# Patient Record
Sex: MALE | Race: WHITE | HISPANIC OR LATINO | Employment: FULL TIME | URBAN - METROPOLITAN AREA
[De-identification: names, ages, dates, MRNs, and addresses within clinical notes are randomized per-mention and may not be internally consistent; named-entity substitution may affect disease eponyms.]

---

## 2023-04-02 ENCOUNTER — ANESTHESIA (OUTPATIENT)
Dept: ANESTHESIOLOGY | Facility: HOSPITAL | Age: 58
End: 2023-04-02

## 2023-04-02 ENCOUNTER — ANESTHESIA EVENT (OUTPATIENT)
Dept: ANESTHESIOLOGY | Facility: HOSPITAL | Age: 58
End: 2023-04-02

## 2023-04-02 NOTE — ANESTHESIA PREPROCEDURE EVALUATION
Procedure:  PRE-OP ONLY    Relevant Problems   CARDIO   (+) Mixed hyperlipidemia        Physical Exam    Airway    Mallampati score: II  TM Distance: >3 FB  Neck ROM: full     Dental   No notable dental hx     Cardiovascular  Cardiovascular exam normal    Pulmonary  Pulmonary exam normal     Other Findings        Anesthesia Plan  ASA Score- 2     Anesthesia Type- IV sedation with anesthesia with ASA Monitors  Additional Monitors:   Airway Plan:           Plan Factors-Exercise tolerance (METS): >4 METS  Chart reviewed  EKG reviewed  Imaging results reviewed  Existing labs reviewed  Patient summary reviewed  Patient is not a current smoker  Patient not instructed to abstain from smoking on day of procedure  Patient did not smoke on day of surgery  Induction-     Postoperative Plan-     Informed Consent- Anesthetic plan and risks discussed with patient  I personally reviewed this patient with the CRNA  Discussed and agreed on the Anesthesia Plan with the CRNA  Mary Ellen Quarles

## 2023-04-03 ENCOUNTER — ANESTHESIA EVENT (OUTPATIENT)
Dept: GASTROENTEROLOGY | Facility: AMBULARY SURGERY CENTER | Age: 58
End: 2023-04-03

## 2023-04-03 ENCOUNTER — HOSPITAL ENCOUNTER (OUTPATIENT)
Dept: GASTROENTEROLOGY | Facility: AMBULARY SURGERY CENTER | Age: 58
Setting detail: OUTPATIENT SURGERY
Discharge: HOME | End: 2023-04-03
Attending: INTERNAL MEDICINE

## 2023-04-03 ENCOUNTER — ANESTHESIA (OUTPATIENT)
Dept: GASTROENTEROLOGY | Facility: AMBULARY SURGERY CENTER | Age: 58
End: 2023-04-03

## 2023-04-03 VITALS
BODY MASS INDEX: 29.25 KG/M2 | HEART RATE: 47 BPM | DIASTOLIC BLOOD PRESSURE: 71 MMHG | OXYGEN SATURATION: 98 % | TEMPERATURE: 97.6 F | HEIGHT: 66 IN | WEIGHT: 182 LBS | RESPIRATION RATE: 18 BRPM | SYSTOLIC BLOOD PRESSURE: 117 MMHG

## 2023-04-03 DIAGNOSIS — Z12.11 SCREENING FOR COLON CANCER: ICD-10-CM

## 2023-04-03 RX ORDER — PROPOFOL 10 MG/ML
INJECTION, EMULSION INTRAVENOUS AS NEEDED
Status: DISCONTINUED | OUTPATIENT
Start: 2023-04-03 | End: 2023-04-03

## 2023-04-03 RX ORDER — PROPOFOL 10 MG/ML
INJECTION, EMULSION INTRAVENOUS CONTINUOUS PRN
Status: DISCONTINUED | OUTPATIENT
Start: 2023-04-03 | End: 2023-04-03

## 2023-04-03 RX ORDER — LIDOCAINE HYDROCHLORIDE 10 MG/ML
INJECTION, SOLUTION EPIDURAL; INFILTRATION; INTRACAUDAL; PERINEURAL AS NEEDED
Status: DISCONTINUED | OUTPATIENT
Start: 2023-04-03 | End: 2023-04-03

## 2023-04-03 RX ORDER — SODIUM CHLORIDE, SODIUM LACTATE, POTASSIUM CHLORIDE, CALCIUM CHLORIDE 600; 310; 30; 20 MG/100ML; MG/100ML; MG/100ML; MG/100ML
INJECTION, SOLUTION INTRAVENOUS CONTINUOUS PRN
Status: DISCONTINUED | OUTPATIENT
Start: 2023-04-03 | End: 2023-04-03

## 2023-04-03 RX ADMIN — LIDOCAINE HYDROCHLORIDE 50 MG: 10 INJECTION, SOLUTION EPIDURAL; INFILTRATION; INTRACAUDAL; PERINEURAL at 09:45

## 2023-04-03 RX ADMIN — SODIUM CHLORIDE, SODIUM LACTATE, POTASSIUM CHLORIDE, AND CALCIUM CHLORIDE: .6; .31; .03; .02 INJECTION, SOLUTION INTRAVENOUS at 09:21

## 2023-04-03 RX ADMIN — PROPOFOL 160 MCG/KG/MIN: 10 INJECTION, EMULSION INTRAVENOUS at 09:45

## 2023-04-03 RX ADMIN — PROPOFOL 60 MG: 10 INJECTION, EMULSION INTRAVENOUS at 09:45

## 2023-04-03 NOTE — H&P
History and Physical - [facility]  Holly Cantor Jane 65 y o  male MRN: [id number]                  HPI: Hazel Enciso is a 65y o  year old male who presents for follow up of history of tubular adenoma removed in 2016  REVIEW OF SYSTEMS: Per the HPI, and otherwise unremarkable  Historical Information   History reviewed  No pertinent past medical history  Past Surgical History:   Procedure Laterality Date   • NO PAST SURGERIES     • [place] COLONOSCOPY FLX DX W/COLLJ SPEC WHEN PFRMD N/A 12/13/2016    Procedure: COLONOSCOPY;  Surgeon: Bella Prakash MD;  Location: [facility]; Service: Gastroenterology     Social History   Social History     Substance and Sexual Activity   Alcohol Use Yes   • Alcohol/week: 1 0 standard drink   • Types: 1 Cans of beer per week    Comment: Socially     Social History     Substance and Sexual Activity   Drug Use No     Social History     Tobacco Use   Smoking Status Former   • Types: Cigars   Smokeless Tobacco Never   Tobacco Comments    per [employer]     Family History   Problem Relation Age of Onset   • No Known Problems Mother    • No Known Problems Father    • No Known Problems Sister    • No Known Problems Brother    • No Known Problems Daughter    • No Known Problems Sister    • No Known Problems Sister    • No Known Problems Daughter    • No Known Problems Daughter        Meds/Allergies       Current Outpatient Medications:   •  atorvastatin (LIPITOR) 20 mg tablet  •  famotidine (PEPCID) 20 mg tablet  •  meclizine (ANTIVERT) 25 mg tablet  •  al mag oxide-diphenhydramine-lidocaine viscous (MAGIC MOUTHWASH) 1:1:1 suspension  •  omega-3-acid ethyl esters (LOVAZA) 1 g capsule  •  Promethazine-DM (PHENERGAN-DM) 6 25-15 mg/5 mL oral syrup  •  simethicone (MYLICON,GAS-X) 790 MG capsule  No current facility-administered medications for this encounter      Facility-Administered Medications Ordered in Other Encounters:   •  lactated ringers infusion, , "Intravenous, Continuous PRN, New Bag at 04/03/23 0276    No Known Allergies    Objective     /60   Pulse (!) 50   Temp (!) 96 9 °F (36 1 °C) (Temporal)   Resp 18   Ht 5' 6\" (1 676 m)   Wt 82 6 kg (182 lb)   SpO2 98%   BMI 29 38 kg/m²       PHYSICAL EXAM    Gen: NAD  Head: NCAT  CV: RRR  CHEST: Clear  ABD: soft, NT/ND  EXT: no edema      ASSESSMENT/PLAN:  This is a 65y o  year old male here for colonoscopy, and he is stable and optimized for his procedure        "

## 2023-04-03 NOTE — ANESTHESIA PREPROCEDURE EVALUATION
Procedure:  COLONOSCOPY    Relevant Problems   CARDIO   (+) Mixed hyperlipidemia        Physical Exam    Airway    Mallampati score: II  TM Distance: >3 FB  Neck ROM: full     Dental   No notable dental hx     Cardiovascular  Cardiovascular exam normal    Pulmonary  Pulmonary exam normal     Other Findings        Anesthesia Plan  ASA Score- 2     Anesthesia Type- IV sedation with anesthesia with ASA Monitors  Additional Monitors:   Airway Plan:           Plan Factors-Exercise tolerance (METS): >4 METS  Chart reviewed  EKG reviewed  Imaging results reviewed  Existing labs reviewed  Patient summary reviewed  Patient is not a current smoker  Patient not instructed to abstain from smoking on day of procedure  Patient did not smoke on day of surgery  Induction-     Postoperative Plan-     Informed Consent- Anesthetic plan and risks discussed with patient  I personally reviewed this patient with the CRNA  Discussed and agreed on the Anesthesia Plan with the CRNA  Loraine Marlow

## 2023-04-03 NOTE — ANESTHESIA POSTPROCEDURE EVALUATION
Post-Op Assessment Note    CV Status:  Stable  Pain Score: 0    Pain management: adequate     Mental Status:  Alert and awake   Hydration Status:  Euvolemic   PONV Controlled:  Controlled   Airway Patency:  Patent      Post Op Vitals Reviewed: Yes      Staff: CRNA         There were no known notable events for this encounter      BP 98/54 (04/03/23 1004)    Temp 97 6 °F (36 4 °C) (04/03/23 1004)    Pulse (!) 48 (04/03/23 1004)   Resp 18 (04/03/23 1004)    SpO2 96 % (04/03/23 1004)

## 2023-05-24 ENCOUNTER — OFFICE VISIT (OUTPATIENT)
Dept: FAMILY MEDICINE CLINIC | Facility: CLINIC | Age: 58
End: 2023-05-24

## 2023-05-24 VITALS
RESPIRATION RATE: 16 BRPM | DIASTOLIC BLOOD PRESSURE: 70 MMHG | SYSTOLIC BLOOD PRESSURE: 120 MMHG | HEART RATE: 61 BPM | OXYGEN SATURATION: 98 % | TEMPERATURE: 99.7 F | HEIGHT: 66 IN | BODY MASS INDEX: 29.77 KG/M2 | WEIGHT: 185.2 LBS

## 2023-05-24 DIAGNOSIS — Z11.4 SCREENING FOR HIV (HUMAN IMMUNODEFICIENCY VIRUS): ICD-10-CM

## 2023-05-24 DIAGNOSIS — Z00.00 ANNUAL PHYSICAL EXAM: Primary | ICD-10-CM

## 2023-05-24 DIAGNOSIS — Z12.5 SCREENING FOR PROSTATE CANCER: ICD-10-CM

## 2023-05-24 DIAGNOSIS — R14.0 ABDOMINAL BLOATING: ICD-10-CM

## 2023-05-24 DIAGNOSIS — Z11.59 ENCOUNTER FOR HEPATITIS C SCREENING TEST FOR LOW RISK PATIENT: ICD-10-CM

## 2023-05-24 DIAGNOSIS — E78.2 MIXED HYPERLIPIDEMIA: ICD-10-CM

## 2023-05-24 RX ORDER — OMEPRAZOLE 40 MG/1
40 CAPSULE, DELAYED RELEASE ORAL DAILY
Qty: 30 CAPSULE | Refills: 1 | Status: SHIPPED | OUTPATIENT
Start: 2023-05-24

## 2023-05-24 NOTE — PROGRESS NOTES
ADULT ANNUAL [facility]    NAME: Aletha Oro  AGE: 63 y o  SEX: male  : 1965     DATE: 2023     Assessment and Plan:     Problem List Items Addressed This Visit        Other    Mixed hyperlipidemia     Updated lab work ordered  No current statin therapy (previoulsy on but stopped on his own)         Relevant Orders    Lipid panel    TSH, 3rd generation with Free T4 reflex    Abdominal bloating     Colonoscopy unrevealing  Stop Pepcid and start Omeprazole   May continue simethicone prn   Referred back to GI   Keep a symptom and food diary          Relevant Medications    omeprazole (PriLOSEC) 40 MG capsule    Other Relevant Orders    Ambulatory Referral to Gastroenterology   Other Visit Diagnoses     Annual physical exam    -  Primary    Relevant Orders    CBC and differential    Comprehensive metabolic panel    Screening for prostate cancer        Relevant Orders    PSA, Total Screen    Encounter for hepatitis C screening test for low risk patient        Relevant Orders    Hepatitis C antibody    Screening for HIV (human immunodeficiency virus)        Relevant Orders    : HIV 1/2 AB/AG w Reflex [facility] for 2 yr old and above          Immunizations and preventive care screenings were discussed with patient today  Appropriate education was printed on patient's after visit summary  Discussed risks and benefits of prostate cancer screening  We discussed the controversial history of PSA screening for prostate cancer in the [place] as well as the risk of over detection and over treatment of prostate cancer by way of PSA screening  The patient understands that PSA blood testing is an imperfect way to screen for prostate cancer and that elevated PSA levels in the blood may also be caused by infection, inflammation, prostatic trauma or manipulation, urological procedures, or by benign prostatic enlargement      The role of the digital rectal examination in prostate cancer screening was also discussed and I discussed with him that there is large interobserver variability in the findings of digital rectal examination  Counseling:  Alcohol/drug use: discussed moderation in alcohol intake, the recommendations for healthy alcohol use, and avoidance of illicit drug use  Dental Health: discussed importance of regular tooth brushing, flossing, and dental visits  Injury prevention: discussed safety/seat belts, safety helmets, smoke detectors, carbon dioxide detectors, and smoking near bedding or upholstery  Sexual health: discussed sexually transmitted diseases, partner selection, use of condoms, avoidance of unintended pregnancy, and contraceptive alternatives  · Exercise: the importance of regular exercise/physical activity was discussed  Recommend exercise 3-5 times per week for at least 30 minutes  BMI Counseling: Body mass index is 29 89 kg/m²  The BMI is above normal  Nutrition recommendations include encouraging healthy choices of fruits and vegetables, moderation in carbohydrate intake and increasing intake of lean protein  Rationale for BMI follow-up plan is due to patient being overweight or obese  Depression Screening and Follow-up Plan: Patient was screened for depression during today's encounter  They screened negative with a PHQ-2 score of 0  No follow-ups on file  Chief Complaint:     No chief complaint on file  History of Present Illness:     Adult Annual Physical   Patient here for a comprehensive physical exam  Patient is Swiss speaking and the  line was used for today's visit  He saw Dr Malathi Coughlin in our office back in January 2023 with abdominal bloating and was started on Pepcid and Gas-X  Pt was also referred to GI for a colonoscopy  Colonoscopy was completed 4/3/23 with Dr Heaven William which showed sigmoid diverticulosis but otherwise normal, repeat due in 5 years    Pt tells me that the Pepcid and Gas-X did not provide any relief and he still has some abdominal bloating with increased belching  Not always related to food/eating  Occurs first thing in the morning some days  He did decrease spicy foods with no improvement  No bowel habit changes, no N/V    Diet and Physical Activity  · Diet/Nutrition: poor diet  · Exercise: no formal exercise  Depression Screening  PHQ-2/9 Depression Screening    Little interest or pleasure in doing things: 0 - not at all  Feeling down, depressed, or hopeless: 0 - not at all  PHQ-2 Score: 0  PHQ-2 Interpretation: Negative depression screen       General Health  · Sleep: sleeps well  · Hearing: normal - bilateral   · Vision: no vision problems  · Dental: regular dental visits   Health  · Symptoms include: none     Review of Systems:     Review of Systems   Constitutional: Negative for chills and fever  HENT: Negative for ear pain and sore throat  Eyes: Negative for pain and visual disturbance  Respiratory: Negative for cough and shortness of breath  Cardiovascular: Negative for chest pain and palpitations  Gastrointestinal: Negative for abdominal pain, blood in stool, constipation, diarrhea, nausea and vomiting  As noted in HPI   Genitourinary: Negative for dysuria and hematuria  Musculoskeletal: Negative for arthralgias and back pain  Skin: Negative for color change and rash  Neurological: Negative for seizures and syncope  Hematological: Negative for adenopathy  Does not bruise/bleed easily  All other systems reviewed and are negative  Past Medical History:     No past medical history on file  Past Surgical History:     Past Surgical History:   Procedure Laterality Date   • NO PAST SURGERIES     • [place] COLONOSCOPY FLX DX W/COLLJ SPEC WHEN PFRMD N/A 12/13/2016    Procedure: COLONOSCOPY;  Surgeon: Angeline Palm MD;  Location: [facility];   Service: Gastroenterology      Family History:     Family History Problem Relation Age of Onset   • No Known Problems Mother    • No Known Problems Father    • No Known Problems Sister    • No Known Problems Brother    • No Known Problems Daughter    • No Known Problems Sister    • No Known Problems Sister    • No Known Problems Daughter    • No Known Problems Daughter       Social History:     Social History     Socioeconomic History   • Marital status: Single     Spouse name: Not on file   • Number of children: 3   • Years of education: Not on file   • Highest education level: Not on file   Occupational History   • Not on file   Tobacco Use   • Smoking status: Former     Types: Cigars   • Smokeless tobacco: Never   • Tobacco comments:     per [employer]   Vaping Use   • Vaping Use: Never used   Substance and Sexual Activity   • Alcohol use:  Yes     Alcohol/week: 1 0 standard drink of alcohol     Types: 1 Cans of beer per week     Comment: Socially   • Drug use: No   • Sexual activity: Not on file   Other Topics Concern   • Not on file   Social History Narrative    Lives with adult children    Dental care     Social Determinants of Health     Financial Resource Strain: Not on file   Food Insecurity: Not on file   Transportation Needs: Not on file   Physical Activity: Not on file   Stress: Not on file   Social Connections: Not on file   Intimate Partner Violence: Not on file   Housing Stability: Not on file      Current Medications:     Current Outpatient Medications   Medication Sig Dispense Refill   • atorvastatin (LIPITOR) 20 mg tablet Take 1 tablet (20 mg total) by mouth daily 90 tablet 1   • famotidine (PEPCID) 20 mg tablet Take 1 tablet (20 mg total) by mouth 2 (two) times a day 60 tablet 2   • omeprazole (PriLOSEC) 40 MG capsule Take 1 capsule (40 mg total) by mouth daily 30 capsule 1   • simethicone (MYLICON,GAS-X) 373 MG capsule Take 1 capsule (180 mg total) by mouth every 6 (six) hours as needed for flatulence 30 capsule 2   • al mag oxide-diphenhydramine-lidocaine "viscous (MAGIC MOUTHWASH) 1:1:1 suspension Swish and swallow 10 mL every 4 (four) hours as needed for mouth pain or discomfort (Patient not taking: Reported on 1/26/2023) 90 mL 0   • meclizine (ANTIVERT) 25 mg tablet Take 1 tablet (25 mg total) by mouth 3 (three) times a day as needed for dizziness 30 tablet 0   • omega-3-acid ethyl esters (LOVAZA) 1 g capsule Take 2 capsules (2 g total) by mouth daily (Patient not taking: Reported on 1/26/2023) 180 capsule 1   • Promethazine-DM (PHENERGAN-DM) 6 25-15 mg/5 mL oral syrup Take 5 mL by mouth 4 (four) times a day as needed for cough (Patient not taking: Reported on 1/26/2023) 118 mL 0     No current facility-administered medications for this visit  Allergies:     No Known Allergies   Physical Exam:     /70 (BP Location: Left arm, Patient Position: Sitting, Cuff Size: Adult)   Pulse 61   Temp 99 7 °F (37 6 °C) (Tympanic)   Resp 16   Ht 5' 6\" (1 676 m)   Wt 84 kg (185 lb 3 2 oz)   SpO2 98%   BMI 29 89 kg/m²     Physical Exam  Vitals and nursing note reviewed  Constitutional:       General: He is not in acute distress  Appearance: He is well-developed  He is obese  He is not ill-appearing, toxic-appearing or diaphoretic  HENT:      Head: Normocephalic and atraumatic  Eyes:      Extraocular Movements: Extraocular movements intact  Conjunctiva/sclera: Conjunctivae normal       Pupils: Pupils are equal, round, and reactive to light  Neck:      Vascular: No carotid bruit  Cardiovascular:      Rate and Rhythm: Normal rate and regular rhythm  Heart sounds: Normal heart sounds  No murmur heard  Pulmonary:      Effort: Pulmonary effort is normal  No respiratory distress  Breath sounds: Normal breath sounds  Abdominal:      General: Bowel sounds are normal  There is no distension  Palpations: Abdomen is soft  There is no mass  Tenderness: There is no abdominal tenderness   There is no right CVA tenderness, left CVA " tenderness, guarding or rebound  Hernia: No hernia is present  Musculoskeletal:         General: No swelling  Normal range of motion  Cervical back: Normal range of motion and neck supple  No rigidity or tenderness  Lymphadenopathy:      Cervical: No cervical adenopathy  Skin:     General: Skin is warm and dry  Capillary Refill: Capillary refill takes less than 2 seconds  Neurological:      General: No focal deficit present  Mental Status: He is alert and oriented to person, place, and time  Psychiatric:         Mood and Affect: Mood normal          Behavior: Behavior normal          Thought Content:  Thought content normal          Judgment: Judgment normal           Linda Nelson, [address]

## 2023-05-24 NOTE — ASSESSMENT & PLAN NOTE
Colonoscopy unrevealing  Stop Pepcid and start Omeprazole   May continue simethicone prn   Referred back to GI   Keep a symptom and food diary

## 2023-05-27 ENCOUNTER — APPOINTMENT (OUTPATIENT)
Dept: LAB | Facility: CLINIC | Age: 58
End: 2023-05-27

## 2023-05-27 DIAGNOSIS — Z11.59 ENCOUNTER FOR HEPATITIS C SCREENING TEST FOR LOW RISK PATIENT: ICD-10-CM

## 2023-05-27 DIAGNOSIS — Z11.4 SCREENING FOR HIV (HUMAN IMMUNODEFICIENCY VIRUS): ICD-10-CM

## 2023-05-27 DIAGNOSIS — Z00.00 ANNUAL PHYSICAL EXAM: ICD-10-CM

## 2023-05-27 DIAGNOSIS — E78.2 MIXED HYPERLIPIDEMIA: ICD-10-CM

## 2023-05-27 DIAGNOSIS — Z12.5 SCREENING FOR PROSTATE CANCER: ICD-10-CM

## 2023-05-27 LAB
ALBUMIN SERPL BCP-MCNC: 3.8 G/DL (ref 3.5–5)
ALP SERPL-CCNC: 80 U/L (ref 46–116)
ALT SERPL W P-5'-P-CCNC: 71 U/L (ref 12–78)
ANION GAP SERPL CALCULATED.3IONS-SCNC: 0 MMOL/L (ref 4–13)
AST SERPL W P-5'-P-CCNC: 34 U/L (ref 5–45)
BASOPHILS # BLD AUTO: 0.03 THOUSANDS/ÂΜL (ref 0–0.1)
BASOPHILS NFR BLD AUTO: 1 % (ref 0–1)
BILIRUB SERPL-MCNC: 0.42 MG/DL (ref 0.2–1)
BUN SERPL-MCNC: 18 MG/DL (ref 5–25)
CALCIUM SERPL-MCNC: 8.6 MG/DL (ref 8.3–10.1)
CHLORIDE SERPL-SCNC: 109 MMOL/L (ref 96–108)
CHOLEST SERPL-MCNC: 210 MG/DL
CO2 SERPL-SCNC: 28 MMOL/L (ref 21–32)
CREAT SERPL-MCNC: 0.83 MG/DL (ref 0.6–1.3)
EOSINOPHIL # BLD AUTO: 0.08 THOUSAND/ÂΜL (ref 0–0.61)
EOSINOPHIL NFR BLD AUTO: 2 % (ref 0–6)
ERYTHROCYTE [DISTWIDTH] IN BLOOD BY AUTOMATED COUNT: 12.7 % (ref 11.6–15.1)
GFR SERPL CREATININE-BSD FRML MDRD: 97 ML/MIN/1.73SQ M
GLUCOSE P FAST SERPL-MCNC: 110 MG/DL (ref 65–99)
HCT VFR BLD AUTO: 46.5 % (ref 36.5–49.3)
HDLC SERPL-MCNC: 30 MG/DL
HGB BLD-MCNC: 15.3 G/DL (ref 12–17)
HIV 1+2 AB+HIV1 P24 AG SERPL QL IA: NORMAL
HIV 2 AB SERPL QL IA: NORMAL
HIV1 AB SERPL QL IA: NORMAL
HIV1 P24 AG SERPL QL IA: NORMAL
IMM GRANULOCYTES # BLD AUTO: 0.01 THOUSAND/UL (ref 0–0.2)
IMM GRANULOCYTES NFR BLD AUTO: 0 % (ref 0–2)
LDLC SERPL CALC-MCNC: 134 MG/DL (ref 0–100)
LYMPHOCYTES # BLD AUTO: 1.9 THOUSANDS/ÂΜL (ref 0.6–4.47)
LYMPHOCYTES NFR BLD AUTO: 35 % (ref 14–44)
MCH RBC QN AUTO: 31.3 PG (ref 26.8–34.3)
MCHC RBC AUTO-ENTMCNC: 32.9 G/DL (ref 31.4–37.4)
MCV RBC AUTO: 95 FL (ref 82–98)
MONOCYTES # BLD AUTO: 0.32 THOUSAND/ÂΜL (ref 0.17–1.22)
MONOCYTES NFR BLD AUTO: 6 % (ref 4–12)
NEUTROPHILS # BLD AUTO: 3.17 THOUSANDS/ÂΜL (ref 1.85–7.62)
NEUTS SEG NFR BLD AUTO: 56 % (ref 43–75)
NONHDLC SERPL-MCNC: 180 MG/DL
NRBC BLD AUTO-RTO: 0 /100 WBCS
PLATELET # BLD AUTO: 198 THOUSANDS/UL (ref 149–390)
PMV BLD AUTO: 10.4 FL (ref 8.9–12.7)
POTASSIUM SERPL-SCNC: 4.1 MMOL/L (ref 3.5–5.3)
PROT SERPL-MCNC: 7.6 G/DL (ref 6.4–8.4)
PSA SERPL-MCNC: 0.34 NG/ML (ref 0–4)
RBC # BLD AUTO: 4.89 MILLION/UL (ref 3.88–5.62)
SODIUM SERPL-SCNC: 137 MMOL/L (ref 135–147)
TRIGL SERPL-MCNC: 230 MG/DL
TSH SERPL DL<=0.05 MIU/L-ACNC: 2.81 UIU/ML (ref 0.45–4.5)
WBC # BLD AUTO: 5.51 THOUSAND/UL (ref 4.31–10.16)

## 2023-05-28 LAB — HCV AB SER QL: NORMAL

## 2023-05-30 DIAGNOSIS — R73.01 IFG (IMPAIRED FASTING GLUCOSE): Primary | ICD-10-CM

## 2023-05-30 DIAGNOSIS — E78.2 MIXED HYPERLIPIDEMIA: ICD-10-CM

## 2023-05-30 RX ORDER — ATORVASTATIN CALCIUM 20 MG/1
20 TABLET, FILM COATED ORAL DAILY
Qty: 90 TABLET | Refills: 1 | Status: SHIPPED | OUTPATIENT
Start: 2023-05-30

## 2023-08-15 ENCOUNTER — OFFICE VISIT (OUTPATIENT)
Dept: GASTROENTEROLOGY | Facility: CLINIC | Age: 58
End: 2023-08-15
Payer: COMMERCIAL

## 2023-08-15 VITALS
DIASTOLIC BLOOD PRESSURE: 62 MMHG | WEIGHT: 182 LBS | HEIGHT: 66 IN | BODY MASS INDEX: 29.25 KG/M2 | SYSTOLIC BLOOD PRESSURE: 120 MMHG | TEMPERATURE: 97.6 F

## 2023-08-15 DIAGNOSIS — R14.0 ABDOMINAL BLOATING: Primary | ICD-10-CM

## 2023-08-15 PROCEDURE — 99214 OFFICE O/P EST MOD 30 MIN: CPT | Performed by: PHYSICIAN ASSISTANT

## 2023-08-15 NOTE — PROGRESS NOTES
[facility] - Outpatient Follow-up Note  [facility] 63 y.o. male MRN: [id number]  Encounter: [id number]          ASSESSMENT AND PLAN:      Shama Mascorro is a 64 y/o Cameroonian-speaking male with with HLD who presents for follow-up. 1. Abdominal bloating  Pt presents because he wanted to discuss his abdominal bloating as he notes he gets this a few times/week; he says he does take GAS-X but is unsure if this is working. He denies pain, heartburn or changes in bowel habits. He says the episodes are very brief, but he was unsure if this is "normal" or not. -explained to pt that this could certainly be due to what he is eating or how much he is evacuating his bowels so he can start a journal to try to tease this out since it is only occurring a few times/week  -start OTC BEANO PRN  ______________________________________________________________________    SUBJECTIVE:  Shama Mascorro is a 64 y/o Cameroonian-speaking male with with HLD who presents for follow-up. Pt says he is here to discuss if this bloating is "normal." he says since he was young, he would wake up with bloating some mornings but they only lasted for a couple of minutes and he is relieved. He says this occurs a few times/week and is not severe. Pt denies abdominal pain, n/v, heartburn, constipation or straining, diarrhea, NSAID use, bloody or black BMs. REVIEW OF SYSTEMS IS OTHERWISE NEGATIVE. Historical Information   History reviewed. No pertinent past medical history. Past Surgical History:   Procedure Laterality Date   • COLONOSCOPY     • NO PAST SURGERIES     • [place] COLONOSCOPY FLX DX W/COLLJ SPEC WHEN PFRMD N/A 12/13/2016    Procedure: COLONOSCOPY;  Surgeon: Yenny Chin MD;  Location: [facility];   Service: Gastroenterology     Social History   Social History     Substance and Sexual Activity   Alcohol Use Yes   • Alcohol/week: 1.0 standard drink of alcohol   • Types: 1 Cans of beer per week    Comment: Socially     Social History     Substance and Sexual Activity   Drug Use No     Social History     Tobacco Use   Smoking Status Former   • Types: Cigars   Smokeless Tobacco Never   Tobacco Comments    per [employer]scripts     Family History   Problem Relation Age of Onset   • No Known Problems Mother    • No Known Problems Father    • No Known Problems Sister    • No Known Problems Brother    • No Known Problems Daughter    • No Known Problems Sister    • No Known Problems Sister    • No Known Problems Daughter    • No Known Problems Daughter        Meds/Allergies       Current Outpatient Medications:   •  al mag oxide-diphenhydramine-lidocaine viscous (MAGIC MOUTHWASH) 1:1:1 suspension  •  atorvastatin (LIPITOR) 20 mg tablet  •  famotidine (PEPCID) 20 mg tablet  •  meclizine (ANTIVERT) 25 mg tablet  •  omega-3-acid ethyl esters (LOVAZA) 1 g capsule  •  omeprazole (PriLOSEC) 40 MG capsule  •  Promethazine-DM (PHENERGAN-DM) 6.25-15 mg/5 mL oral syrup  •  simethicone (MYLICON,GAS-X) 570 MG capsule    No Known Allergies        Objective     Blood pressure 120/62, temperature 97.6 °F (36.4 °C), temperature source Tympanic, height 5' 6" (1.676 m), weight 82.6 kg (182 lb). Body mass index is 29.38 kg/m². PHYSICAL EXAM:      General Appearance:   Alert, cooperative, no distress   HEENT:   Normocephalic, atraumatic, anicteric.     Neck:  Supple, symmetrical, trachea midline   Lungs:   Clear to auscultation bilaterally; no rales, rhonchi or wheezing; respirations unlabored    Heart[de-identified]   Regular rate and rhythm; no murmur, rub, or gallop. Abdomen:   Soft, non-tender, non-distended; normal bowel sounds; no masses, no organomegaly    Genitalia:   Deferred    Rectal:   Deferred    Extremities:  No cyanosis, clubbing or edema    Pulses:  2+ and symmetric    Skin:  No jaundice, rashes, or lesions    Lymph nodes:  No palpable cervical lymphadenopathy        Lab Results:   No visits with results within 1 Day(s) from this visit.    Latest known visit with results is:   Appointment on 05/27/2023   Component Date Value   • WBC 05/27/2023 5.51    • RBC 05/27/2023 4.89    • Hemoglobin 05/27/2023 15.3    • Hematocrit 05/27/2023 46.5    • MCV 05/27/2023 95    • MCH 05/27/2023 31.3    • MCHC 05/27/2023 32.9    • RDW 05/27/2023 12.7    • MPV 05/27/2023 10.4    • Platelets 00/70/2420 198    • nRBC 05/27/2023 0    • Neutrophils Relative 05/27/2023 56    • Immat GRANS % 05/27/2023 0    • Lymphocytes Relative 05/27/2023 35    • Monocytes Relative 05/27/2023 6    • Eosinophils Relative 05/27/2023 2    • Basophils Relative 05/27/2023 1    • Neutrophils Absolute 05/27/2023 3.17    • Immature Grans Absolute 05/27/2023 0.01    • Lymphocytes Absolute 05/27/2023 1.90    • Monocytes Absolute 05/27/2023 0.32    • Eosinophils Absolute 05/27/2023 0.08    • Basophils Absolute 05/27/2023 0.03    • Sodium 05/27/2023 137    • Potassium 05/27/2023 4.1    • Chloride 05/27/2023 109 (H)    • CO2 05/27/2023 28    • ANION GAP 05/27/2023 0 (L)    • BUN 05/27/2023 18    • Creatinine 05/27/2023 0.83    • Glucose, Fasting 05/27/2023 110 (H)    • Calcium 05/27/2023 8.6    • AST 05/27/2023 34    • ALT 05/27/2023 71    • Alkaline Phosphatase 05/27/2023 80    • Total Protein 05/27/2023 7.6    • Albumin 05/27/2023 3.8    • Total Bilirubin 05/27/2023 0.42    • eGFR 05/27/2023 97    • Cholesterol 05/27/2023 210 (H)    • Triglycerides 05/27/2023 230 (H)    • HDL, Direct 05/27/2023 30 (L)    • LDL Calculated 05/27/2023 134 (H)    • Non-HDL-Chol (CHOL-HDL) 05/27/2023 180    • TSH 3RD GENERATON 05/27/2023 2.808    • PSA 05/27/2023 0.34    • Hepatitis C Ab 05/27/2023 Non-reactive    • HIV-1 p24 Antigen 05/27/2023 Non-Reactive    • HIV-1 Antibody 05/27/2023 Non-Reactive    • HIV-2 Antibody 05/27/2023 Non-Reactive    • HIV Ag-Ab 5th Gen 05/27/2023 Non-Reactive          Radiology Results:   No results found.

## 2023-08-15 NOTE — PATIENT INSTRUCTIONS
Start using over-the-counter BEANO as needed for bloating     Diverticulosis   LO QUE NECESITA SABER:   La diverticulosis es mendez condición que ocasiona que se formen pequeñas bolsas llamadas divertícula en el intestino. Estas bolsas dificultan que las evacuaciones intestinales pasen a través del [facility]. INSTRUCCIONES SOBRE EL IVORY HOSPITALARIA:   Regrese a la galen de emergencias si:  Siente dolor prasanna en el lado polo de la parte inferior del abdomen. Ivelisse deposiciones son de color martínez oscuro o brillante. Llame a lord médico si:  Usted tiene fiebre y escalofríos. Usted se siente mareado o aturdido. Usted tiene náuseas o está vomitando. Usted nota un cambio en ivelisse evacuaciones intestinales. Usted tiene preguntas o inquietudes acerca de lord condición o cuidado. Medicamentos:  Los medicamentos puede administrarse para facilitar la evacuación intestinal. Puede que también necesite medicamentos para tratar síntomas tales Ledell Ghotra y dolor. Presho ivelisse medicamentos elisa se le haya indicado. Consulte con lord médico si usted meli que lord medicamento no le está ayudando o si presenta efectos secundarios. Infórmele al médico si usted es alérgico a algún medicamento. Mantenga mendez lista actualizada de los [place], las vitaminas y los productos herbales que almita. Incluya los siguientes datos de los medicamentos: cantidad, frecuencia y motivo de administración. Traiga con usted la lista o los envases de las píldoras a ivelisse citas de seguimiento. Lleve la lista de los medicamentos con usted en dillon de mendez emergencia. Cuidados personales: El objetivo del tratamiento es controlar los síntomas que tiene y evitar otros problemas elisa la diverticulitis. La diverticulitis es la inflamación o infección de los divertículos. Lord médico podría recomendarle cualquiera de los siguientes:  Consuma mendez variedad de alimentos ricos en [place].  Los alimentos altos en [employer] ayudan a regular los movimientos intestinales. Los alimentos con alto contenido de fibra [employer] frijoles cocidos, las frutas, verduras y algunos cereales. La mayoría de los adultos necesitan de 25 a 35 gramos de fibra por día. Lord médico le puede recomendar que [place]. Pregunte a lord médico cuánta fibra debería consumir. Aumente la fibra lentamente. Usted podría presentar malestar o inflamación estomacal y gases si añade fibra a lord dieta demasiado rápido. Usted podría necesitar constance un suplemento de fibra si no está recibiendo suficiente de los alimentos. [place] líquidos elisa se le haya indicado. Es posible que usted necesite beber de 2 a 3 litros (8 a 12 vasos) de líquido cada día. Pregunte a lord médico sobre la cantidad de líquido que necesita constance todos los nelson y cuáles le recomienda. Aplique calor sobre el abdomen de 20 a 30 minutos cada 2 horas por los [employer] indiquen. El calor ayuda a disminuir el dolor y los espasmos musculares. Ayuda a prevenir la diverticulitis u otros síntomas: Lo siguiente puede ayudar a disminuir el riesgo de diverticulitis o síntomas, elisa hemorragias. Hable con lord proveedor acerca de estos u otras cosas que puede hacer para prevenir los problemas que pueden ocurrir con diverticulosis. Realice actividad física con regularidad. Pregunte a lord médico acerca del mejor plan de ejercicio para usted. El ejercicio puede ayudarlo a tener evacuaciones intestinales regulares. Francis 30 minutos de ejercicio la mayoría de los días de [place]. Mantenga un peso saludable. Pregúntele a lord médico cuál es el peso ideal para usted. Pídale que lo ayude a crear un plan para bajar de peso si tiene sobrepeso. No fume. La nicotina y otros químicos en los cigarrillos incrementan el riesgo de diverticulitis. Pida información a lord médico si usted actualmente fuma y necesita ayuda para dejar de fumar. Los cigarrillos electrónicos o el tabaco sin humo igualmente contienen nicotina.  Consulte con lord médico antes de [employer]. Pregunte a lord médico si es seguro constance AINES. AINES puede aumentar lord riesgo de diverticulitis. Acuda a la consulta de control con lord médico según las indicaciones: Anote jun preguntas para que se acuerde de hacerlas barney jun visitas. © Copyright [facility] 2022 Information is for End User's use only and may not be sold, redistributed or otherwise used for commercial purposes. Esta información es sólo para uso en educación. Lord intención no es darle un consejo médico sobre enfermedades o tratamientos. Colsulte con lord [place] Sorrel farmacéutico antes de seguir cualquier régimen médico para saber si es seguro y efectivo para usted. Diverticulosis   WHAT YOU NEED TO KNOW:   Diverticulosis is a condition that causes small pockets called diverticula to form in your intestine. These pockets make it difficult for bowel movements to pass through your digestive system. DISCHARGE INSTRUCTIONS:   Return to the emergency department if:   You have severe pain on the left side of your lower abdomen. Your bowel movements are bright or dark red. Call your doctor if:   You have a fever and chills. You feel dizzy or lightheaded. You have nausea, or you are vomiting. You have a change in your bowel movements. You have questions or concerns about your condition or care. Medicines:   Medicines  to soften your bowel movements may be given. You may also need medicines to treat symptoms such as bloating and pain. Take your medicine as directed. Contact your healthcare provider if you think your medicine is not helping or if you have side effects. Tell your provider if you are allergic to any medicine. Keep a list of the medicines, vitamins, and herbs you take. Include the amounts, and when and why you take them. Bring the list or the pill bottles to follow-up visits. Carry your medicine list with you in case of an emergency. Self-care:   The goal of treatment is to manage any symptoms you have and prevent other problems such as diverticulitis. Diverticulitis is swelling or infection of the diverticula. Your healthcare provider may recommend any of the following:  Eat a variety of high-fiber foods. High-fiber foods help you have regular bowel movements. High-fiber foods include cooked beans, fruits, vegetables, and some cereals. Most adults need 25 to 35 grams of fiber each day. Your healthcare provider may recommend that you have more. Ask your healthcare provider how much fiber you need. Increase fiber slowly. You may have abdominal discomfort, bloating, and gas if you add fiber to your diet too quickly. You may need to take a fiber supplement if you are not getting enough fiber from food. Drink liquids as directed. You may need to drink 2 to 3 liters (8 to 12 cups) of liquids every day. Ask your healthcare provider how much liquid to drink each day and which liquids are best for you. Apply heat  on your abdomen for 20 to 30 minutes every 2 hours for as many days as directed. Heat helps decrease pain and muscle spasms. Help prevent diverticulitis or other symptoms: The following may help decrease your risk for diverticulitis or symptoms, such as bleeding. Talk to your provider about these or other things you can do to prevent problems that may occur with diverticulosis. Exercise regularly. Ask your healthcare provider about the best exercise plan for you. Exercise can help you have regular bowel movements. Get 30 minutes of exercise on most days of the week. Maintain a healthy weight. Ask your healthcare provider what a healthy weight is for you. Ask him or her to help you create a weight loss plan if you are overweight. Do not smoke. Nicotine and other chemicals in cigarettes increase your risk for diverticulitis. Ask your healthcare provider for information if you currently smoke and need help to quit.  E-cigarettes or smokeless tobacco still contain nicotine. Talk to your healthcare provider before you use these products. Ask your healthcare provider if it is safe to take NSAIDs. NSAIDs may increase your risk of diverticulitis. Follow up with your doctor as directed:  Write down your questions so you remember to ask them during your visits. © Copyright Johnny Vargas 2022 Information is for End User's use only and may not be sold, redistributed or otherwise used for commercial purposes. The above information is an  only. It is not intended as medical advice for individual conditions or treatments. Talk to your doctor, nurse or pharmacist before following any medical regimen to see if it is safe and effective for you.